# Patient Record
Sex: MALE
[De-identification: names, ages, dates, MRNs, and addresses within clinical notes are randomized per-mention and may not be internally consistent; named-entity substitution may affect disease eponyms.]

---

## 2022-06-14 ENCOUNTER — APPOINTMENT (OUTPATIENT)
Dept: NEUROSURGERY | Facility: CLINIC | Age: 77
End: 2022-06-14
Payer: MEDICARE

## 2022-06-14 VITALS
HEART RATE: 66 BPM | TEMPERATURE: 97.6 F | BODY MASS INDEX: 28.61 KG/M2 | WEIGHT: 178 LBS | SYSTOLIC BLOOD PRESSURE: 97 MMHG | DIASTOLIC BLOOD PRESSURE: 58 MMHG | HEIGHT: 66 IN | OXYGEN SATURATION: 96 %

## 2022-06-14 DIAGNOSIS — Z83.438 FAMILY HISTORY OF OTHER DISORDER OF LIPOPROTEIN METABOLISM AND OTHER LIPIDEMIA: ICD-10-CM

## 2022-06-14 DIAGNOSIS — Z86.79 PERSONAL HISTORY OF OTHER DISEASES OF THE CIRCULATORY SYSTEM: ICD-10-CM

## 2022-06-14 DIAGNOSIS — Z82.49 FAMILY HISTORY OF ISCHEMIC HEART DISEASE AND OTHER DISEASES OF THE CIRCULATORY SYSTEM: ICD-10-CM

## 2022-06-14 DIAGNOSIS — Z86.39 PERSONAL HISTORY OF OTHER ENDOCRINE, NUTRITIONAL AND METABOLIC DISEASE: ICD-10-CM

## 2022-06-14 DIAGNOSIS — Z78.9 OTHER SPECIFIED HEALTH STATUS: ICD-10-CM

## 2022-06-14 DIAGNOSIS — Z72.89 OTHER PROBLEMS RELATED TO LIFESTYLE: ICD-10-CM

## 2022-06-14 PROBLEM — Z00.00 ENCOUNTER FOR PREVENTIVE HEALTH EXAMINATION: Status: ACTIVE | Noted: 2022-06-14

## 2022-06-14 PROCEDURE — 99204 OFFICE O/P NEW MOD 45 MIN: CPT

## 2022-06-14 RX ORDER — CARVEDILOL 3.12 MG/1
TABLET, FILM COATED ORAL
Refills: 0 | Status: ACTIVE | COMMUNITY

## 2022-06-14 RX ORDER — ATORVASTATIN CALCIUM 40 MG/1
40 TABLET, FILM COATED ORAL
Refills: 0 | Status: ACTIVE | COMMUNITY

## 2022-06-14 RX ORDER — SPIRONOLACTONE 25 MG/1
25 TABLET ORAL
Refills: 0 | Status: ACTIVE | COMMUNITY

## 2022-06-23 ENCOUNTER — NON-APPOINTMENT (OUTPATIENT)
Age: 77
End: 2022-06-23

## 2022-06-23 RX ORDER — LEVETIRACETAM 750 MG/1
750 TABLET, FILM COATED ORAL
Qty: 60 | Refills: 0 | Status: ACTIVE | COMMUNITY
Start: 2022-06-23 | End: 1900-01-01

## 2022-06-29 ENCOUNTER — APPOINTMENT (OUTPATIENT)
Dept: NEUROSURGERY | Facility: CLINIC | Age: 77
End: 2022-06-29

## 2022-06-29 VITALS
WEIGHT: 179 LBS | OXYGEN SATURATION: 97 % | BODY MASS INDEX: 28.77 KG/M2 | SYSTOLIC BLOOD PRESSURE: 110 MMHG | HEIGHT: 66 IN | TEMPERATURE: 97.3 F | HEART RATE: 95 BPM | DIASTOLIC BLOOD PRESSURE: 68 MMHG

## 2022-06-29 DIAGNOSIS — S06.5X9A TRAUMATIC SUBDURAL HEMORRHAGE WITH LOSS OF CONSCIOUSNESS OF UNSPECIFIED DURATION, INITIAL ENCOUNTER: ICD-10-CM

## 2022-06-29 PROCEDURE — 99213 OFFICE O/P EST LOW 20 MIN: CPT

## 2022-06-29 RX ORDER — AMOXICILLIN AND CLAVULANATE POTASSIUM 875; 125 MG/1; MG/1
875-125 TABLET, COATED ORAL
Qty: 20 | Refills: 0 | Status: ACTIVE | COMMUNITY
Start: 2022-03-23

## 2022-06-29 RX ORDER — DEXAMETHASONE 1.5 MG 1.5 MG/1
1.5 TABLET ORAL
Qty: 21 | Refills: 0 | Status: ACTIVE | COMMUNITY
Start: 2022-05-30

## 2022-06-29 NOTE — HISTORY OF PRESENT ILLNESS
[FreeTextEntry1] : SEVERIANO GAITAN is a 76 year old male presents for initial neurosurgical evaluation of left subdural hematoma.  \par Past medical history includes HTN, HLD, heart valve replacement. No smoker.  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 Dr. Se Bashir .Complications include stroke and additional surgery per patient's wife.  Patient does not wish to follow up with their service. During his hospitalization he had a pacemaker placed 5/12/2022. \par Patient denies any headaches, n/v or new vision changes. He is under care of ophthalmologist.  No seizures. Patient is currently taking Keppra BID for seizure management. Patient denies any numbness/tingling or weakness of extremities. \par \par His left scalp incision is healing well.  Scab at the superior aspect of incision.  His pacemaker site healing.well.

## 2022-06-29 NOTE — DISCUSSION/SUMMARY
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 Etiology uncertain either due to small stroke or facial injury during embolization. \par \par Subdural hematoma appears to be resolving. \par From the Neuro vascular point of view he can resume anticoagulation. \par He will follow up with Neurosurgery regarding further management or need for repeated imaging and the management of AED.

## 2022-06-29 NOTE — PHYSICAL EXAM
[General Appearance - Alert] : alert [General Appearance - In No Acute Distress] : in no acute distress [Oriented To Time, Place, And Person] : oriented to person, place, and time [Impaired Insight] : insight and judgment were intact [Affect] : the affect was normal [Person] : oriented to person [Place] : oriented to place [Time] : oriented to time [Concentration Intact] : normal concentrating ability [Visual Intact] : visual attention was ~T not ~L decreased [Naming Objects] : no difficulty naming common objects [Repeating Phrases] : no difficulty repeating a phrase [Writing A Sentence] : no difficulty writing a sentence [Fluency] : fluency intact [Comprehension] : comprehension intact [Reading] : reading intact [Past History] : adequate knowledge of personal past history [Cranial Nerves Optic (II)] : visual acuity intact bilaterally,  visual fields full to confrontation, pupils equal round and reactive to light [Cranial Nerves Oculomotor (III)] : extraocular motion intact [Cranial Nerves Trigeminal (V)] : facial sensation intact symmetrically [Cranial Nerves Vestibulocochlear (VIII)] : hearing was intact bilaterally [Cranial Nerves Glossopharyngeal (IX)] : tongue and palate midline [Cranial Nerves Accessory (XI - Cranial And Spinal)] : head turning and shoulder shrug symmetric [Cranial Nerves Hypoglossal (XII)] : there was no tongue deviation with protrusion [Motor Tone] : muscle tone was normal in all four extremities [Motor Strength] : muscle strength was normal in all four extremities [No Muscle Atrophy] : normal bulk in all four extremities [Sensation Pain / Temperature Decrease] : pain and temperature was intact [Sensation Tactile Decrease] : light touch was intact [Abnormal Walk] : normal gait [Balance] : balance was intact [2+] : Ankle jerk left 2+ [Past-pointing] : there was no past-pointing [Tremor] : no tremor present [Plantar Reflex Right Only] : normal on the right [Plantar Reflex Left Only] : normal on the left [FreeTextEntry5] : Left central facial paralysis

## 2022-06-29 NOTE — REASON FOR VISIT
[Initial Evaluation] : an initial evaluation [Spouse] : spouse [FreeTextEntry1] : left subdural hematoma

## 2022-06-29 NOTE — ASSESSMENT
[FreeTextEntry1] : Mr. Dean presents with above history .  He is neurologically intact.  Patient does not offer any complaints.\par Plan:\par CT head w/o contrast to assess resolution of ICH. - resume anticoagulation\par MRI brain w/w contrast at 6 week magdiel after pacemaker matures around 6/28/2022.\par  Continue BP medications as ordered to maintained BP <140/90. Continue statin therapy with an LDL goal of < 70 for secondary stroke prevention.\par Follow up after CT head for formal review and assess scalp incision\par Patient has been given an opportunity to ask and have their questions answered to their satisfaction.\par Patient knows to call the office if there are any new or worsening symptoms.\par \par I have seen and examined the patient along with my nurse practitioner, Catalina Lopez .  I have personally determined the assessment and plan of care based on today's encounter.\par

## 2022-06-29 NOTE — PHYSICAL EXAM
[General Appearance - Alert] : alert [General Appearance - In No Acute Distress] : in no acute distress [Oriented To Time, Place, And Person] : oriented to person, place, and time [Impaired Insight] : insight and judgment were intact [Affect] : the affect was normal [Sensation Tactile Decrease] : light touch was intact [Sensation Pain / Temperature Decrease] : pain and temperature was intact [Balance] : balance was intact

## 2022-06-29 NOTE — HISTORY OF PRESENT ILLNESS
[FreeTextEntry1] : Interval history: as per initial visit \par SEVERIANO GAITAN is a 76 year old male presents for initial neurosurgical evaluation of left subdural hematoma. \par Past medical history includes HTN, HLD, heart valve replacement. No smoker. 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 Dr. Se Hawthorne .Complications include stroke and additional surgery per patient's wife. Patient does not wish to follow up with their service. During his hospitalization he had a pacemaker placed 5/12/2022. \par Patient denies any headaches, n/v or new vision changes. He is under care of ophthalmologist. No seizures. Patient is currently taking Keppra BID for seizure management. Patient denies any numbness/tingling or weakness of extremities. \par \par His left scalp incision is healing well. Scab at the superior aspect of incision. His pacemaker site healing.well.\par \par 6/29/2022-Follow up visit\par \par The patient comes for follow up evaluation after repeated CT of the head for recommendations regarding re-initiation of anticoagulation. They also have concerns regarding the need of continuation of seizure prophylaxis. \par His facial weakness has improved. He is undergoing therapy for facial palsy. They also have questions regarding driving.

## 2022-06-30 PROBLEM — S06.5X9A SUBDURAL HEMATOMA: Status: ACTIVE | Noted: 2022-06-14

## 2022-06-30 RX ORDER — WARFARIN SODIUM 1 MG/1
1 TABLET ORAL
Qty: 30 | Refills: 0 | Status: ACTIVE | COMMUNITY
Start: 2022-02-01

## 2022-06-30 RX ORDER — TAMSULOSIN HYDROCHLORIDE 0.4 MG/1
0.4 CAPSULE ORAL
Qty: 90 | Refills: 0 | Status: ACTIVE | COMMUNITY
Start: 2022-03-23

## 2022-06-30 RX ORDER — LEVETIRACETAM 100 MG/ML
100 SOLUTION ORAL
Qty: 450 | Refills: 0 | Status: ACTIVE | COMMUNITY
Start: 2022-05-30

## 2022-06-30 NOTE — ASSESSMENT
[FreeTextEntry1] : Mr. Dean presents with above history and imaging. Patient is neurologically intact. I have personally reviewed his CT head w/o contrast that reveals interval resolution of ICH.  \par Plan:\par Keep incision clean and dry. \par Obtain records from [facility] to assess if there is evidence of seizure.\par Resume aspirin therapy.\par Maintain fall precautions. \par Neuro opthalmology for 6 th nerve palsy. \par Follow up one month \par Patient has been given an opportunity to ask and have their questions answered to their satisfaction.\par Patient knows to call the office if there are any new or worsening symptoms.\par

## 2022-06-30 NOTE — HISTORY OF PRESENT ILLNESS
[de-identified] : SEVERIANO GAITAN is a 76 year old male presents for initial neurosurgical evaluation of left subdural hematoma. \par Past medical history includes HTN, HLD, heart valve replacement 2017 No smoker. 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 Dr. Se Hawthorne .Complications include stroke and additional surgery per patient's wife. Patient does not wish to follow up with their service. During his hospitalization he had a pacemaker placed 5/12/2022. \par Patient denies any headaches, n/v or new vision changes. He is under care of ophthalmologist Dr. Scottie Esposito in [place] that diagnosed the 6th nerve palsy.  He has mild dysarthria.  No seizures. Patient is currently taking Keppra BID for seizure management. Per wife, it is unclear if he had a seizure at [facility]. Patient denies any numbness/tingling or weakness of extremities. \par He presents with a repeat CT head w/o contrast with interval resolution of ICH.  His incision is healing. Dry and intact.

## 2022-06-30 NOTE — REVIEW OF SYSTEMS
[As Noted in HPI] : as noted in HPI [Negative] : Heme/Lymph [Seizures] : convulsions [Tension Headache] : no tension-type headache

## 2022-06-30 NOTE — REASON FOR VISIT
[New Patient Visit] : a new patient visit [Referred By: _________] : Patient was referred by LUNA [FreeTextEntry1] : left subdural hematoma

## 2022-07-19 ENCOUNTER — NON-APPOINTMENT (OUTPATIENT)
Age: 77
End: 2022-07-19

## 2022-07-21 ENCOUNTER — RX RENEWAL (OUTPATIENT)
Age: 77
End: 2022-07-21

## 2022-07-21 RX ORDER — LEVETIRACETAM 750 MG/1
750 TABLET, FILM COATED ORAL
Qty: 60 | Refills: 1 | Status: ACTIVE | COMMUNITY
Start: 2022-07-21 | End: 1900-01-01